# Patient Record
Sex: MALE | Race: BLACK OR AFRICAN AMERICAN
[De-identification: names, ages, dates, MRNs, and addresses within clinical notes are randomized per-mention and may not be internally consistent; named-entity substitution may affect disease eponyms.]

---

## 2017-06-10 ENCOUNTER — HOSPITAL ENCOUNTER (EMERGENCY)
Dept: HOSPITAL 72 - EMR | Age: 67
LOS: 1 days | Discharge: HOME | End: 2017-06-11
Payer: MEDICARE

## 2017-06-10 VITALS — BODY MASS INDEX: 28.6 KG/M2 | WEIGHT: 230 LBS | HEIGHT: 75 IN

## 2017-06-10 VITALS — DIASTOLIC BLOOD PRESSURE: 77 MMHG | SYSTOLIC BLOOD PRESSURE: 128 MMHG

## 2017-06-10 DIAGNOSIS — N30.00: ICD-10-CM

## 2017-06-10 DIAGNOSIS — I10: ICD-10-CM

## 2017-06-10 DIAGNOSIS — R53.1: Primary | ICD-10-CM

## 2017-06-10 DIAGNOSIS — E11.9: ICD-10-CM

## 2017-06-10 DIAGNOSIS — R60.9: ICD-10-CM

## 2017-06-10 LAB
APPEARANCE UR: CLEAR
BASOPHILS NFR BLD AUTO: 1.5 % (ref 0–2)
EOSINOPHIL NFR BLD AUTO: 1.3 % (ref 0–3)
ERYTHROCYTE [DISTWIDTH] IN BLOOD BY AUTOMATED COUNT: 12 % (ref 11.6–14.8)
KETONES UR QL STRIP: NEGATIVE
LEUKOCYTE ESTERASE UR QL STRIP: (no result)
LYMPHOCYTES NFR BLD AUTO: 38 % (ref 20–45)
MCH RBC QN AUTO: 32.5 PG (ref 27–31)
MCHC RBC AUTO-ENTMCNC: 36 G/DL (ref 32–36)
MCV RBC AUTO: 90 FL (ref 80–99)
MONOCYTES NFR BLD AUTO: 10.6 % (ref 1–10)
NEUTROPHILS NFR BLD AUTO: 48.7 % (ref 45–75)
NITRITE UR QL STRIP: NEGATIVE
PH UR STRIP: 6.5 [PH] (ref 4.5–8)
PLATELET # BLD: 196 K/UL (ref 150–450)
PMV BLD AUTO: 8.7 FL (ref 6.5–10.1)
PROT UR QL STRIP: NEGATIVE
RBC # BLD AUTO: 4.71 M/UL (ref 4.7–6.1)
SP GR UR STRIP: 1.01 (ref 1–1.03)
UROBILINOGEN UR-MCNC: 1 MG/DL (ref 0–1)
WBC # BLD AUTO: 6 K/UL (ref 4.8–10.8)

## 2017-06-10 PROCEDURE — 84484 ASSAY OF TROPONIN QUANT: CPT

## 2017-06-10 PROCEDURE — 85025 COMPLETE CBC W/AUTO DIFF WBC: CPT

## 2017-06-10 PROCEDURE — 82248 BILIRUBIN DIRECT: CPT

## 2017-06-10 PROCEDURE — 83880 ASSAY OF NATRIURETIC PEPTIDE: CPT

## 2017-06-10 PROCEDURE — 36415 COLL VENOUS BLD VENIPUNCTURE: CPT

## 2017-06-10 PROCEDURE — 87086 URINE CULTURE/COLONY COUNT: CPT

## 2017-06-10 PROCEDURE — 80053 COMPREHEN METABOLIC PANEL: CPT

## 2017-06-10 PROCEDURE — 81003 URINALYSIS AUTO W/O SCOPE: CPT

## 2017-06-10 PROCEDURE — 96374 THER/PROPH/DIAG INJ IV PUSH: CPT

## 2017-06-10 PROCEDURE — 71010: CPT

## 2017-06-10 PROCEDURE — 82550 ASSAY OF CK (CPK): CPT

## 2017-06-10 PROCEDURE — 70450 CT HEAD/BRAIN W/O DYE: CPT

## 2017-06-10 PROCEDURE — 99284 EMERGENCY DEPT VISIT MOD MDM: CPT

## 2017-06-10 PROCEDURE — 93005 ELECTROCARDIOGRAM TRACING: CPT

## 2017-06-10 PROCEDURE — 82553 CREATINE MB FRACTION: CPT

## 2017-06-10 PROCEDURE — 80300: CPT

## 2017-06-10 NOTE — EMERGENCY ROOM REPORT
History of Present Illness


General


Chief Complaint:  Generalized Weakness


Source:  Patient, EMS





Present Illness


HPI


Is a 67-year-old male with history hypertension.  He also has history of 

alcohol abuse.  He presents with chief complaint of generalized weakness and 

also pedal edema.  His been about a month now.  But worse tonight.  Complaining 

of penis in his fingers and feet.  Think that he may have stroke.  No nausea no 

vomiting.  No diaphoresis.  No chest pain.


Allergies:  


Coded Allergies:  


     No Known Allergies (Unverified , 12/16/13)





Patient History


Past Medical History:  see triage record, old chart reviewed, HTN


Past Surgical History:  other


Pertinent Family History:  none


Social History:  Reports: alcohol use


Immunizations:  other


Reviewed Nursing Documentation:  PMH: Agreed, PSxH: Agreed





Nursing Documentation-PMH


Past Medical History:  No History, Except For


Hx Hypertension:  Yes


Hx Diabetes:  Yes -  PTA


Hx Cancer:  No


Hx Gastrointestinal Problems:  No


Hx Neurological Problems:  No





Review of Systems


Constitutional:  Reports: weakness


Eye:  Denies: blurred vision, eye pain


ENT:  Denies: ear pain, nose congestion, throat swelling


Respiratory:  Denies: cough, shortness of breath


Cardiovascular:  Denies: chest pain, palpitations


Gastrointestinal:  Denies: abdominal pain, diarrhea, nausea, vomiting


Musculoskeletal:  Denies: back pain, joint pain


Skin:  Denies: rash


Neurological:  Denies: headache, numbness


Endocrine:  Denies: increased thirst, increased urine


Hematologic/Lymphatic:  Denies: easy bruising


All Other Systems:  negative except mentioned in HPI





Physical Exam





Vital Signs








  Date Time  Temp Pulse Resp B/P Pulse Ox O2 Delivery O2 Flow Rate FiO2


 


6/10/17 23:09  94 16 128/77 96 Room Air  


 


6/10/17 23:14 98.7       





vitals normal


Sp02 EP Interpretation:  reviewed, normal


General Appearance:  well appearing, no apparent distress, alert


Head:  normocephalic, atraumatic


Eyes:  bilateral eye EOMI, bilateral eye PERRL


ENT:  hearing grossly normal, normal pharynx


Neck:  full range of motion, supple, no meningismus


Respiratory:  chest non-tender, lungs clear, normal breath sounds


Cardiovascular #1:  regular rate, rhythm, no murmur


Gastrointestinal:  normal bowel sounds, non tender, no mass, no organomegaly, 

no bruit, non-distended


Musculoskeletal:  back normal, normal range of motion, swelling - 2+ pitting 

edema


Neurologic:  alert, oriented x3


Psychiatric:  mood/affect normal


Skin:  warm/dry





Medical Decision Making


Diagnostic Impression:  


 Primary Impression:  


 Episode of generalized weakness


 Additional Impressions:  


 UTI (urinary tract infection)


 Qualified Codes:  N30.00 - Acute cystitis without hematuria


 Pedal edema


ER Course


Patient presents with joint weakness.  No evidence of ACS, PE, CVA to name a 

few.  May have a mild urinary tract infection.  He diuresed well with IV Lasix.

  No evidence of CHF.  We'll discharge home to


Lab Results Impression


labs unremarkable


EKG Diagnostic Results


EKG Time:  23:52


EP Interpretation:  no


Rate:  normal


Rhythm:  NSR


ST Segments:  no acute changes





Rhythm Strip Diag. Results


Rhythm Strip Time:  23:53


EP Interpretation:  yes


Rate:  68


Rhythm:  NSR





Chest X-Ray Diagnostic Results


Chest X-Ray Ordered:  Yes


# of Views/Limited/Complete:  1 View


Interpretation:  no consolidation, no effusion, no pneumothorax, no acute 

cardiopulmonary disease


Indication:  Shortness of Breath


Impression:  No acute disease


Date Electronically Signed:  Fito 10, 2017


Time Electronically Signed:  23:53





CT/MRI/US Diagnostic Results


CT/MRI/US Diagnostic Results :  


   Imaging Test Ordered:  CT head


   Impression


negative per radiologist





Last Vital Signs








  Date Time  Temp Pulse Resp B/P Pulse Ox O2 Delivery O2 Flow Rate FiO2


 


6/10/17 23:14 98.7 92 16 128/77 96 Room Air  








Status:  improved


Disposition:  HOME, SELF-CARE


Condition:  Stable


Scripts


Cephalexin* (KEFLEX*) 500 Mg Capsule


500 MG ORAL TID, #21 CAP 0 Refills


   Prov: TERE HOFFMAN M.D.         6/11/17


Patient Instructions:  Weakness





Additional Instructions:  


Followup your DrTony in 7 days.  Increase your Lasix to 40 mg a day for a week.  

Return if worse.











TERE HOFFMAN M.D. Fito 10, 2017 23:53

## 2017-06-11 VITALS — SYSTOLIC BLOOD PRESSURE: 119 MMHG | DIASTOLIC BLOOD PRESSURE: 76 MMHG

## 2017-06-11 VITALS — DIASTOLIC BLOOD PRESSURE: 76 MMHG | SYSTOLIC BLOOD PRESSURE: 119 MMHG

## 2017-06-11 VITALS — DIASTOLIC BLOOD PRESSURE: 67 MMHG | SYSTOLIC BLOOD PRESSURE: 122 MMHG

## 2017-06-11 LAB
ALBUMIN/GLOB SERPL: 1.6 {RATIO} (ref 1–2.7)
ALT SERPL-CCNC: 14 U/L (ref 3–41)
ANION GAP SERPL CALC-SCNC: 14 MMOL/L (ref 5–15)
AST SERPL-CCNC: 25 U/L (ref 5–40)
BACTERIA #/AREA URNS HPF: (no result) /HPF
BILIRUB DIRECT SERPL-MCNC: 0.2 MG/DL (ref 0.1–0.3)
CALCIUM SERPL-MCNC: 9 MG/DL (ref 8.6–10.2)
CHLORIDE SERPL-SCNC: 101 MEQ/L (ref 98–107)
CK MB SERPL-MCNC: 6.9 NG/ML (ref ?–6.7)
CO2 SERPL-SCNC: 26 MEQ/L (ref 20–30)
CREAT SERPL-MCNC: 1.3 MG/DL (ref 0.7–1.2)
GFR SERPLBLD BASED ON 1.73 SQ M-ARVRAT: > 60 ML/MIN (ref 60–?)
GLOBULIN SER-MCNC: 2.5 G/DL
HEMOLYSIS: 4
POTASSIUM SERPL-SCNC: 3.9 MEQ/L (ref 3.4–4.9)
PROT SERPL-MCNC: 6.7 G/DL (ref 6.6–8.7)
RBC #/AREA URNS HPF: (no result) /HPF (ref 0–0)
SODIUM SERPL-SCNC: 141 MEQ/L (ref 135–145)
SQUAMOUS #/AREA URNS LPF: (no result) /LPF
TROPONIN I SERPL-MCNC: < 0.3 NG/ML (ref ?–0.3)
WBC #/AREA URNS HPF: (no result) /HPF (ref 0–0)
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
## 2017-06-13 NOTE — CARDIOLOGY REPORT
--------------- APPROVED REPORT --------------





EKG Measurement

Heart [id number]

ME [id number]

[id number]

[id number]

[id number]





Normal sinus rhythm

Normal ECG

## 2017-08-29 ENCOUNTER — HOSPITAL ENCOUNTER (EMERGENCY)
Dept: HOSPITAL 72 - EMR | Age: 67
LOS: 1 days | Discharge: HOME | End: 2017-08-30
Payer: MEDICARE

## 2017-08-29 VITALS — BODY MASS INDEX: 28.62 KG/M2 | HEIGHT: 76 IN | WEIGHT: 235 LBS

## 2017-08-29 VITALS — SYSTOLIC BLOOD PRESSURE: 147 MMHG | DIASTOLIC BLOOD PRESSURE: 80 MMHG

## 2017-08-29 DIAGNOSIS — I10: ICD-10-CM

## 2017-08-29 DIAGNOSIS — L02.31: Primary | ICD-10-CM

## 2017-08-29 DIAGNOSIS — L03.317: ICD-10-CM

## 2017-08-29 DIAGNOSIS — E11.9: ICD-10-CM

## 2017-08-29 PROCEDURE — 99283 EMERGENCY DEPT VISIT LOW MDM: CPT

## 2017-08-29 PROCEDURE — 64450 NJX AA&/STRD OTHER PN/BRANCH: CPT

## 2017-08-29 PROCEDURE — 10060 I&D ABSCESS SIMPLE/SINGLE: CPT

## 2017-08-30 ENCOUNTER — HOSPITAL ENCOUNTER (EMERGENCY)
Dept: HOSPITAL 72 - EMR | Age: 67
Discharge: HOME | End: 2017-08-30
Payer: MEDICARE

## 2017-08-30 VITALS — SYSTOLIC BLOOD PRESSURE: 138 MMHG | DIASTOLIC BLOOD PRESSURE: 78 MMHG

## 2017-08-30 VITALS — SYSTOLIC BLOOD PRESSURE: 134 MMHG | DIASTOLIC BLOOD PRESSURE: 77 MMHG

## 2017-08-30 VITALS — HEIGHT: 76 IN | WEIGHT: 235 LBS | BODY MASS INDEX: 28.62 KG/M2

## 2017-08-30 VITALS — DIASTOLIC BLOOD PRESSURE: 72 MMHG | SYSTOLIC BLOOD PRESSURE: 126 MMHG

## 2017-08-30 VITALS — SYSTOLIC BLOOD PRESSURE: 126 MMHG | DIASTOLIC BLOOD PRESSURE: 72 MMHG

## 2017-08-30 DIAGNOSIS — Z48.01: Primary | ICD-10-CM

## 2017-08-30 DIAGNOSIS — L02.31: ICD-10-CM

## 2017-08-30 DIAGNOSIS — E11.9: ICD-10-CM

## 2017-08-30 DIAGNOSIS — I10: ICD-10-CM

## 2017-08-30 DIAGNOSIS — F11.20: ICD-10-CM

## 2017-08-30 PROCEDURE — 99283 EMERGENCY DEPT VISIT LOW MDM: CPT

## 2017-08-30 NOTE — EMERGENCY ROOM REPORT
History of Present Illness


General


Chief Complaint:  Skin Rash/Abscess


Source:  Patient





Present Illness


HPI


Is a 67-year-old male with a history of diabetes and hypertension.  He uses a 

walker.  He was seen here last night for an abscess to his buttock.  He was I&

D.  He came in for wound check.  He also complained that the dressing  was 

across his anus and because of his disability he cannot get to it.  He had a 

bowel movement and the stool is stuck to the dressing..  Denies any other 

complaint.


Allergies:  


Coded Allergies:  


     No Known Allergies (Unverified , 12/16/13)





Patient History


Past Medical History:  see triage record, old chart reviewed


Past Surgical History:  other


Pertinent Family History:  none


Social History:  Denies: smoking


Immunizations:  other


Reviewed Nursing Documentation:  PMH: Agreed, PSxH: Agreed





Nursing Documentation-PMH


Hx Cardiac Problems:  Yes - cervical spine surgery


Hx Hypertension:  Yes


Hx Diabetes:  Yes -  PTA


Hx Cancer:  No


Hx Gastrointestinal Problems:  No





Review of Systems


Eye:  Denies: eye pain, blurred vision


ENT:  Denies: ear pain, nose congestion, throat swelling


Respiratory:  Denies: cough, shortness of breath


Cardiovascular:  Denies: chest pain, palpitations


Gastrointestinal:  Denies: abdominal pain, diarrhea, nausea, vomiting


Musculoskeletal:  Denies: back pain, joint pain


Skin:  Denies: rash


Neurological:  Denies: headache, numbness


Endocrine:  Denies: increased thirst, increased urine


Hematologic/Lymphatic:  Denies: easy bruising


All Other Systems:  negative except mentioned in HPI





Physical Exam





Vital Signs








  Date Time  Temp Pulse Resp B/P (MAP) Pulse Ox O2 Delivery O2 Flow Rate FiO2


 


8/30/17 22:16 97.3 94 18 134/77 98 Room Air  





vitals normal


Sp02 EP Interpretation:  reviewed, normal


General Appearance:  well appearing, no apparent distress, alert


Head:  normocephalic, atraumatic


Eyes:  bilateral eye PERRL, bilateral eye EOMI


ENT:  hearing grossly normal, normal pharynx


Neck:  full range of motion, supple, no meningismus


Respiratory:  chest non-tender, lungs clear, normal breath sounds


Cardiovascular #1:  regular rate, rhythm, no murmur


Gastrointestinal:  normal bowel sounds, non tender, no mass, no organomegaly, 

no bruit, non-distended


Genitourinary:  other - Wound to the right buttock show good granulation 

tissue.  There is to an indurated area of about 3-4 cm.  No drainage however.  

The dressing and the area around it is full of stool.  After cleaning him up I 

examine and it looks well.


Musculoskeletal:  back normal, normal range of motion, other - Patient use a 

walker


Neurologic:  alert


Psychiatric:  mood/affect normal


Skin:  warm/dry





Medical Decision Making


Diagnostic Impression:  


 Primary Impression:  


 Wound check, abscess


 Additional Impression:  


 Opioid dependence


 Qualified Codes:  F11.20 - Opioid dependence, uncomplicated


ER Course


Patient presents with a wound check.  The wound looks clean.  We'll put a small 

a dressing so doesn't appear with his bowel movement.  Area cleaned up.  

Patient received large amount of oxycodone every month.  I will put him on 

Motrin.  Initially he did not volunteer this.  I saw this on the [employer] system.





Last Vital Signs








  Date Time  Temp Pulse Resp B/P (MAP) Pulse Ox O2 Delivery O2 Flow Rate FiO2


 


8/30/17 22:16 97.3 94 18 134/77 98 Room Air  








Status:  improved


Disposition:  HOME, SELF-CARE


Condition:  Stable


Scripts


Ibuprofen* (MOTRIN*) 600 Mg Tablet


600 MG ORAL THREE TIMES A DAY, #30 TAB 0 Refills


   Prov: TERE HOFFMAN M.D.         8/30/17


Referrals:  


JIE BLUNT (PCP)


Patient Instructions:  Abscess





Additional Instructions:  


Keep wound clean.  Followup with your DrTony in 7 days.  Return if symptom worsen.











TERE HOFFMAN M.D. Aug 30, 2017 22:52

## 2017-08-30 NOTE — EMERGENCY ROOM REPORT
History of Present Illness


General


Chief Complaint:  Skin Rash/Abscess


Source:  Patient





Present Illness


HPI


67-year-old male no significant past medical history presenting with right-

sided buttock pain and swelling for 3 days.  Patient states that he noticed 

lump that was getting more swollen.  Denies any purulent drainage.  Patient 

denies any history of diabetes or any history of any abscesses on bodies.  

Patient denies any fever or chills.  Denies any pain with defecation


Allergies:  


Coded Allergies:  


     No Known Allergies (Unverified , 12/16/13)





Patient History


Past Medical History:  see triage record


Past Surgical History:  none


Pertinent Family History:  none


Reviewed Nursing Documentation:  PMH: Agreed, PSxH: Agreed





Nursing Documentation-PMH


Past Medical History:  No History, Except For


Hx Hypertension:  Yes


Hx Diabetes:  Yes -  PTA


Hx Cancer:  No


Hx Gastrointestinal Problems:  No


Hx Neurological Problems:  No





Review of Systems


All Other Systems:  negative except mentioned in HPI





Physical Exam





Vital Signs








  Date Time  Temp Pulse Resp B/P (MAP) Pulse Ox O2 Delivery O2 Flow Rate FiO2


 


8/29/17 23:25 98.4 90 15 147/80 99 Room Air  








Sp02 EP Interpretation:  reviewed, normal


General Appearance:  normal inspection, well appearing, no apparent distress, 

alert, GCS 15, non-toxic


Head:  normocephalic, atraumatic


Eyes:  bilateral eye normal inspection, bilateral eye PERRL, bilateral eye EOMI


ENT:  normal ENT inspection, normal pharynx, normal voice, moist mucus membranes


Neck:  normal inspection, full range of motion, supple


Respiratory:  normal inspection, lungs clear, normal breath sounds, no 

respiratory distress, no retraction, no wheezing, speaking full sentences, 

chest symmetrical


Cardiovascular #1:  normal inspection, regular rate, rhythm, no edema, normal 

capillary refill


Cardiovascular #2:  2+ radial (R), 2+ radial (L)


Gastrointestinal:  normal inspection, non tender, soft, non-distended, no 

guarding


Rectal:  other - 2 x 2 centimeter area of induration and erythema on right but 

talks, not adjacent to perirectal area


Genitourinary:  no CVA tenderness


Musculoskeletal:  normal inspection, back normal, normal range of motion, non-

tender


Neurologic:  normal inspection, alert, oriented x3, responsive, motor strength/

tone normal, sensory intact, normal gait, speech normal


Psychiatric:  normal inspection, judgement/insight normal, memory normal


Skin:  warm/dry, well hydrated, normal turgor, other - Right buttocks with 2 x 

2 centimeter area of erythema and induration.  Tender to palpation.  No 

purulent drainage..





Procedures


Incision and Drainage


Incision and Drainage :  


   Consent:  Verbal


   Site:  R buttocks


   Blade Size:  11


   I & D Procedure:  betadine prep, sterile drapes applied, sterile dressing 

applied, gauze wick placed


   Wound Location:  other - R buttocks


   Wound's Depth, Shape:  superficial, linear


   Wound Length (cm):  3


   Wound Explored:  clean


   Anesthesia:  Lidocaine w/ Epi


   Volume Anesthetic (ccs):  5


   Patient Tolerated:  Well


   Complications:  None





Medical Decision Making


Diagnostic Impression:  


 Primary Impression:  


 Abscess


 Additional Impression:  


 Cellulitis of buttock, right


ER Course


67-year-old male with right buttocks pain and swelling for 3 days





Differential diagnosis


Abscess with cellulitis





Plan


Incision and drainage


Antibiotics





ER course


Incision and drainage performed minimal purulent drainage





Disposition


Patient is to be discharged to home.  Patient was instructed to take 

antibiotics.  Patient is instructed to see his primary care DrTony within 2 days 

for wound recheck.  Patient is instructed to return to the emergency room for 

worsening rash, fever, chills,.





Last Vital Signs








  Date Time  Temp Pulse Resp B/P (MAP) Pulse Ox O2 Delivery O2 Flow Rate FiO2


 


8/29/17 23:25 98.4 83 16 147/80 100 Room Air  








Disposition:  HOME, SELF-CARE


Condition:  Improved


Scripts


Doxycycline Monohydrate* (DOXYCYCLINE MONOHYDRATE*) 100 Mg Capsule


100 MG ORAL Q12H for 7 Days, #14 CAP 0 Refills


   Prov: Edel Lieberman M.D.         8/30/17 


Cephalexin* (KEFLEX*) 500 Mg Capsule


500 MG ORAL Q6H for 10 Days, #28 CAP 0 Refills


   Prov: Edel Lieberman M.D.         8/30/17


Referrals:  


JIE BLUNT (PCP)











Edel Lieberman M.D. Aug 30, 2017 00:04

## 2018-03-07 ENCOUNTER — HOSPITAL ENCOUNTER (EMERGENCY)
Dept: HOSPITAL 72 - EMR | Age: 68
LOS: 1 days | Discharge: HOME | End: 2018-03-08
Payer: MEDICARE

## 2018-03-07 VITALS — SYSTOLIC BLOOD PRESSURE: 115 MMHG | DIASTOLIC BLOOD PRESSURE: 78 MMHG

## 2018-03-07 VITALS — BODY MASS INDEX: 28.62 KG/M2 | HEIGHT: 76 IN | WEIGHT: 235 LBS

## 2018-03-07 DIAGNOSIS — R60.0: ICD-10-CM

## 2018-03-07 DIAGNOSIS — R10.9: Primary | ICD-10-CM

## 2018-03-07 DIAGNOSIS — I10: ICD-10-CM

## 2018-03-07 DIAGNOSIS — E11.9: ICD-10-CM

## 2018-03-07 LAB
ADD MANUAL DIFF: NO
ALBUMIN SERPL-MCNC: 3.4 G/DL (ref 3.4–5)
ALBUMIN/GLOB SERPL: 1.1 {RATIO} (ref 1–2.7)
ALP SERPL-CCNC: 101 U/L (ref 46–116)
ALT SERPL-CCNC: 27 U/L (ref 12–78)
ANION GAP SERPL CALC-SCNC: 5 MMOL/L (ref 5–15)
APPEARANCE UR: CLEAR
APTT BLD: 27 SEC (ref 23–33)
APTT PPP: YELLOW S
AST SERPL-CCNC: 24 U/L (ref 15–37)
BASOPHILS NFR BLD AUTO: 2.5 % (ref 0–2)
BILIRUB DIRECT SERPL-MCNC: 0.3 MG/DL (ref 0–0.3)
BILIRUB SERPL-MCNC: 1.7 MG/DL (ref 0.2–1)
BUN SERPL-MCNC: 16 MG/DL (ref 7–18)
CALCIUM SERPL-MCNC: 8.7 MG/DL (ref 8.5–10.1)
CHLORIDE SERPL-SCNC: 106 MMOL/L (ref 98–107)
CO2 SERPL-SCNC: 27 MMOL/L (ref 21–32)
CREAT SERPL-MCNC: 1.5 MG/DL (ref 0.55–1.3)
EOSINOPHIL NFR BLD AUTO: 1.7 % (ref 0–3)
ERYTHROCYTE [DISTWIDTH] IN BLOOD BY AUTOMATED COUNT: 11.6 % (ref 11.6–14.8)
GLOBULIN SER-MCNC: 3.1 G/DL
GLUCOSE UR STRIP-MCNC: NEGATIVE MG/DL
HCT VFR BLD CALC: 41.8 % (ref 42–52)
HGB BLD-MCNC: 15 G/DL (ref 14.2–18)
INR PPP: 1 (ref 0.9–1.1)
KETONES UR QL STRIP: NEGATIVE
LEUKOCYTE ESTERASE UR QL STRIP: (no result)
LYMPHOCYTES NFR BLD AUTO: 30.7 % (ref 20–45)
MCV RBC AUTO: 90 FL (ref 80–99)
MONOCYTES NFR BLD AUTO: 14.6 % (ref 1–10)
NEUTROPHILS NFR BLD AUTO: 50.6 % (ref 45–75)
NITRITE UR QL STRIP: NEGATIVE
PH UR STRIP: 6.5 [PH] (ref 4.5–8)
PLATELET # BLD: 178 K/UL (ref 150–450)
POTASSIUM SERPL-SCNC: 3.9 MMOL/L (ref 3.5–5.1)
PROT UR QL STRIP: NEGATIVE
RBC # BLD AUTO: 4.66 M/UL (ref 4.7–6.1)
SODIUM SERPL-SCNC: 138 MMOL/L (ref 136–145)
SP GR UR STRIP: 1.01 (ref 1–1.03)
UROBILINOGEN UR-MCNC: 1 MG/DL (ref 0–1)
WBC # BLD AUTO: 4 K/UL (ref 4.8–10.8)

## 2018-03-07 PROCEDURE — 80053 COMPREHEN METABOLIC PANEL: CPT

## 2018-03-07 PROCEDURE — 82248 BILIRUBIN DIRECT: CPT

## 2018-03-07 PROCEDURE — 85610 PROTHROMBIN TIME: CPT

## 2018-03-07 PROCEDURE — 80307 DRUG TEST PRSMV CHEM ANLYZR: CPT

## 2018-03-07 PROCEDURE — 96374 THER/PROPH/DIAG INJ IV PUSH: CPT

## 2018-03-07 PROCEDURE — 85730 THROMBOPLASTIN TIME PARTIAL: CPT

## 2018-03-07 PROCEDURE — 99284 EMERGENCY DEPT VISIT MOD MDM: CPT

## 2018-03-07 PROCEDURE — 83690 ASSAY OF LIPASE: CPT

## 2018-03-07 PROCEDURE — 36415 COLL VENOUS BLD VENIPUNCTURE: CPT

## 2018-03-07 PROCEDURE — 85025 COMPLETE CBC W/AUTO DIFF WBC: CPT

## 2018-03-07 PROCEDURE — 81003 URINALYSIS AUTO W/O SCOPE: CPT

## 2018-03-07 PROCEDURE — 83880 ASSAY OF NATRIURETIC PEPTIDE: CPT

## 2018-03-07 NOTE — EMERGENCY ROOM REPORT
History of Present Illness


General


Chief Complaint:  Abdominal Pain


Source:  Patient





Present Illness


HPI


Mrs. 68-year-old male with a history of alcohol abuse.  He presents with chief 

complaint of "my liver is hurting."  Onset yesterday.  Achy cramping pain.  No 

nausea no vomiting.  No diarrhea.  Does have increasing edema to his lower 

extremity.  No shortness of breath.  No diaphoresis.  No chest pain.  Similar 

symptom in the past.  Pain is 7 out of 10.


Allergies:  


Coded Allergies:  


     No Known Allergies (Unverified , 12/16/13)





Patient History


Past Medical History:  see triage record, old chart reviewed


Past Surgical History:  unable to obtain


Pertinent Family History:  none


Social History:  Reports: alcohol use


Immunizations:  other


Reviewed Nursing Documentation:  PMH: Agreed, PSxH: Agreed





Nursing Documentation-PMH


Hx Cardiac Problems:  Yes - cervical spine surgery , cervical stenosos


Hx Hypertension:  Yes


Hx Diabetes:  Yes -  PTA


Hx Cancer:  No


Hx Gastrointestinal Problems:  No





Review of Systems


Eye:  Denies: eye pain, blurred vision


ENT:  Denies: ear pain, nose congestion, throat swelling


Respiratory:  Denies: cough, shortness of breath


Cardiovascular:  Denies: chest pain, palpitations


Gastrointestinal:  Reports: abdominal pain, Denies: diarrhea, nausea, vomiting


Musculoskeletal:  Denies: back pain, joint pain


Skin:  Denies: rash


Neurological:  Denies: headache, numbness


Endocrine:  Denies: increased thirst, increased urine


Hematologic/Lymphatic:  Denies: easy bruising


All Other Systems:  negative except mentioned in HPI





Physical Exam





Vital Signs








  Date Time  Temp Pulse Resp B/P (MAP) Pulse Ox O2 Delivery O2 Flow Rate FiO2


 


3/7/18 21:02 99.1 80 18 115/78 98 Room Air  





 99.1       





vitals normal


Sp02 EP Interpretation:  reviewed, normal


General Appearance:  well appearing, no apparent distress, alert


Head:  normocephalic, atraumatic


Eyes:  bilateral eye PERRL, bilateral eye EOMI


ENT:  hearing grossly normal, normal pharynx


Neck:  full range of motion, supple, no meningismus


Respiratory:  chest non-tender, lungs clear, normal breath sounds


Cardiovascular #1:  regular rate, rhythm, no murmur


Gastrointestinal:  normal bowel sounds, non tender, no mass, no organomegaly, 

no bruit, non-distended


Musculoskeletal:  back normal, gait/station normal, normal range of motion, 

swelling - 1+ pitting edema


Neurologic:  alert, oriented x3


Psychiatric:  mood/affect normal


Skin:  warm/dry





Medical Decision Making


Diagnostic Impression:  


 Primary Impression:  


 Abdominal pain of unknown etiology


 Additional Impression:  


 Pedal edema


ER Course


Patient with abdominal pain.  This may be secondary to alcoholic gastritis/

hepatitis/cirrhosis to name a few.  No evidence of an acute abdomen.  He felt 

better now.  We'll discharge home.


Lab Results Impression


labs unremarkable.





Last Vital Signs








  Date Time  Temp Pulse Resp B/P (MAP) Pulse Ox O2 Delivery O2 Flow Rate FiO2


 


3/7/18 21:30 99.1 80 18 115/78 98 Room Air  





 99.1       








Status:  improved


Disposition:  HOME, SELF-CARE


Condition:  Stable


Scripts


Ibuprofen* (MOTRIN*) 600 Mg Tablet


600 MG ORAL THREE TIMES A DAY, #30 TAB 0 Refills


   Prov: TERE HOFFMAN M.D.         3/7/18


Patient Instructions:  Abdominal Pain, Adult





Additional Instructions:  


Abstain from alcohol.  Follow-up with with doctor in 3-5 days.  Return if worse.











TERE HOFFMAN M.D. Mar 7, 2018 23:55

## 2018-03-08 VITALS — SYSTOLIC BLOOD PRESSURE: 169 MMHG | DIASTOLIC BLOOD PRESSURE: 92 MMHG

## 2019-01-04 ENCOUNTER — HOSPITAL ENCOUNTER (EMERGENCY)
Dept: HOSPITAL 87 - ER | Age: 69
Discharge: HOME | End: 2019-01-04
Payer: MEDICARE

## 2019-01-04 VITALS — WEIGHT: 308.65 LBS | BODY MASS INDEX: 41.8 KG/M2 | HEIGHT: 72 IN

## 2019-01-04 VITALS — SYSTOLIC BLOOD PRESSURE: 152 MMHG | DIASTOLIC BLOOD PRESSURE: 76 MMHG

## 2019-01-04 DIAGNOSIS — R53.1: Primary | ICD-10-CM

## 2019-01-04 DIAGNOSIS — H70.891: ICD-10-CM

## 2019-01-04 DIAGNOSIS — Z98.890: ICD-10-CM

## 2019-01-04 DIAGNOSIS — I10: ICD-10-CM

## 2019-01-04 DIAGNOSIS — E11.9: ICD-10-CM

## 2019-01-04 DIAGNOSIS — F14.10: ICD-10-CM

## 2019-01-04 LAB
AMPHETAMINES UR QL SCN: NEGATIVE
APTT PPP: 28.3 SEC (ref 23.4–31)
BARBITURATES UR QL SCN: NEGATIVE
BASOPHILS NFR BLD AUTO: 0.4 % (ref 0–2)
BENZODIAZ UR QL SCN: NEGATIVE
BZE UR QL SCN: (no result)
CANNABINOIDS UR QL SCN: NEGATIVE
CHLORIDE SERPL-SCNC: 105 MEQ/L (ref 98–107)
EOSINOPHIL NFR BLD AUTO: 1 % (ref 0–5)
ERYTHROCYTE [DISTWIDTH] IN BLOOD BY AUTOMATED COUNT: 13.5 % (ref 11.6–14.6)
ETHANOL SERPL-MCNC: < 10 MG/DL
HCT VFR BLD AUTO: 42.9 % (ref 42–52)
HGB BLD-MCNC: 15.2 G/DL (ref 14–18)
INR PPP: 1.1
LYMPHOCYTES NFR BLD AUTO: 17.9 % (ref 20–50)
MCH RBC QN AUTO: 33.6 PG (ref 28–32)
MCV RBC AUTO: 95.1 FL (ref 80–94)
METHADONE UR QL SCN: NEGATIVE
MONOCYTES NFR BLD AUTO: 12.5 % (ref 2–8)
NEUTROPHILS NFR BLD AUTO: 68.2 % (ref 40–76)
OPIATES UR QL SCN: NEGATIVE
PCP UR QL SCN: NEGATIVE
PLATELET # BLD AUTO: 212 X1000/UL (ref 130–400)
PMV BLD AUTO: 9.6 FL (ref 7.4–10.4)
PROTHROMBIN TIME: 10.7 SEC (ref 9.1–11.1)
RBC # BLD AUTO: 4.51 MILL/UL (ref 4.7–6.1)

## 2019-01-04 PROCEDURE — 83605 ASSAY OF LACTIC ACID: CPT

## 2019-01-04 PROCEDURE — 87086 URINE CULTURE/COLONY COUNT: CPT

## 2019-01-04 PROCEDURE — 85025 COMPLETE CBC W/AUTO DIFF WBC: CPT

## 2019-01-04 PROCEDURE — 36415 COLL VENOUS BLD VENIPUNCTURE: CPT

## 2019-01-04 PROCEDURE — 85610 PROTHROMBIN TIME: CPT

## 2019-01-04 PROCEDURE — 84484 ASSAY OF TROPONIN QUANT: CPT

## 2019-01-04 PROCEDURE — 72125 CT NECK SPINE W/O DYE: CPT

## 2019-01-04 PROCEDURE — 71045 X-RAY EXAM CHEST 1 VIEW: CPT

## 2019-01-04 PROCEDURE — 99284 EMERGENCY DEPT VISIT MOD MDM: CPT

## 2019-01-04 PROCEDURE — 70450 CT HEAD/BRAIN W/O DYE: CPT

## 2019-01-04 PROCEDURE — 80053 COMPREHEN METABOLIC PANEL: CPT

## 2019-01-04 PROCEDURE — 93005 ELECTROCARDIOGRAM TRACING: CPT

## 2019-01-04 PROCEDURE — 87040 BLOOD CULTURE FOR BACTERIA: CPT

## 2019-01-04 PROCEDURE — 80305 DRUG TEST PRSMV DIR OPT OBS: CPT

## 2019-01-04 PROCEDURE — 85730 THROMBOPLASTIN TIME PARTIAL: CPT

## 2019-01-04 PROCEDURE — 83880 ASSAY OF NATRIURETIC PEPTIDE: CPT

## 2019-05-27 ENCOUNTER — HOSPITAL ENCOUNTER (EMERGENCY)
Dept: HOSPITAL 87 - ER | Age: 69
Discharge: HOME | End: 2019-05-27
Payer: MEDICARE

## 2019-05-27 VITALS — SYSTOLIC BLOOD PRESSURE: 151 MMHG | DIASTOLIC BLOOD PRESSURE: 79 MMHG

## 2019-05-27 VITALS — HEIGHT: 76 IN | BODY MASS INDEX: 30.34 KG/M2 | WEIGHT: 249.12 LBS

## 2019-05-27 DIAGNOSIS — Z98.1: ICD-10-CM

## 2019-05-27 DIAGNOSIS — I10: ICD-10-CM

## 2019-05-27 DIAGNOSIS — H61.23: Primary | ICD-10-CM

## 2019-05-27 DIAGNOSIS — F99: ICD-10-CM

## 2019-05-27 DIAGNOSIS — F14.10: ICD-10-CM

## 2019-05-27 DIAGNOSIS — E11.9: ICD-10-CM

## 2019-05-27 PROCEDURE — 99283 EMERGENCY DEPT VISIT LOW MDM: CPT

## 2019-05-27 PROCEDURE — 69209 REMOVE IMPACTED EAR WAX UNI: CPT

## 2019-07-26 ENCOUNTER — HOSPITAL ENCOUNTER (INPATIENT)
Dept: HOSPITAL 87 - ER | Age: 69
LOS: 4 days | Discharge: LEFT BEFORE DISCHARGE | DRG: 291 | End: 2019-07-30
Attending: FAMILY MEDICINE | Admitting: FAMILY MEDICINE
Payer: MEDICARE

## 2019-07-26 VITALS — WEIGHT: 264 LBS | BODY MASS INDEX: 32.15 KG/M2 | HEIGHT: 76 IN

## 2019-07-26 DIAGNOSIS — I50.31: ICD-10-CM

## 2019-07-26 DIAGNOSIS — L03.115: ICD-10-CM

## 2019-07-26 DIAGNOSIS — M19.90: ICD-10-CM

## 2019-07-26 DIAGNOSIS — E66.9: ICD-10-CM

## 2019-07-26 DIAGNOSIS — E87.6: ICD-10-CM

## 2019-07-26 DIAGNOSIS — N18.2: ICD-10-CM

## 2019-07-26 DIAGNOSIS — I13.0: Primary | ICD-10-CM

## 2019-07-26 DIAGNOSIS — M48.00: ICD-10-CM

## 2019-07-26 DIAGNOSIS — L03.116: ICD-10-CM

## 2019-07-26 DIAGNOSIS — E87.1: ICD-10-CM

## 2019-07-26 DIAGNOSIS — Z79.899: ICD-10-CM

## 2019-07-26 PROCEDURE — 96366 THER/PROPH/DIAG IV INF ADDON: CPT

## 2019-07-26 PROCEDURE — 93306 TTE W/DOPPLER COMPLETE: CPT

## 2019-07-26 PROCEDURE — 84484 ASSAY OF TROPONIN QUANT: CPT

## 2019-07-26 PROCEDURE — 83880 ASSAY OF NATRIURETIC PEPTIDE: CPT

## 2019-07-26 PROCEDURE — 71045 X-RAY EXAM CHEST 1 VIEW: CPT

## 2019-07-26 PROCEDURE — 93970 EXTREMITY STUDY: CPT

## 2019-07-26 PROCEDURE — 96365 THER/PROPH/DIAG IV INF INIT: CPT

## 2019-07-26 PROCEDURE — 99291 CRITICAL CARE FIRST HOUR: CPT

## 2019-07-26 PROCEDURE — 80061 LIPID PANEL: CPT

## 2019-07-26 PROCEDURE — 36415 COLL VENOUS BLD VENIPUNCTURE: CPT

## 2019-07-26 PROCEDURE — 83605 ASSAY OF LACTIC ACID: CPT

## 2019-07-26 PROCEDURE — 93005 ELECTROCARDIOGRAM TRACING: CPT

## 2019-07-27 VITALS — DIASTOLIC BLOOD PRESSURE: 74 MMHG | SYSTOLIC BLOOD PRESSURE: 138 MMHG

## 2019-07-27 VITALS — DIASTOLIC BLOOD PRESSURE: 71 MMHG | SYSTOLIC BLOOD PRESSURE: 120 MMHG

## 2019-07-27 VITALS — SYSTOLIC BLOOD PRESSURE: 115 MMHG | DIASTOLIC BLOOD PRESSURE: 72 MMHG

## 2019-07-27 VITALS — DIASTOLIC BLOOD PRESSURE: 70 MMHG | SYSTOLIC BLOOD PRESSURE: 137 MMHG

## 2019-07-27 VITALS — SYSTOLIC BLOOD PRESSURE: 118 MMHG | DIASTOLIC BLOOD PRESSURE: 58 MMHG

## 2019-07-27 LAB
APTT PPP: 28 SEC (ref 23.4–31)
BASOPHILS NFR BLD MANUAL: 1 % (ref 0–2)
CHLORIDE SERPL-SCNC: 105 MEQ/L (ref 98–107)
CHLORIDE SERPL-SCNC: 110 MEQ/L (ref 98–107)
EOSINOPHIL NFR BLD MANUAL: 1 % (ref 0–5)
ERYTHROCYTE [DISTWIDTH] IN BLOOD BY AUTOMATED COUNT: 13.3 % (ref 11.6–14.6)
HCT VFR BLD AUTO: 39.8 % (ref 42–52)
HDLC SERPL-MCNC: 89 MG/DL (ref 40–59)
HGB BLD-MCNC: 13.9 G/DL (ref 14–18)
INR PPP: 1.1
LDLC SERPL DIRECT ASSAY-MCNC: 92 MG/DL (ref 5–100)
LYMPHOCYTES NFR BLD MANUAL: 36 % (ref 20–50)
MCH RBC QN AUTO: 32.2 PG (ref 28–32)
MCV RBC AUTO: 92.2 FL (ref 80–94)
MONOCYTES NFR BLD MANUAL: 14 % (ref 2–8)
NEUTS SEG NFR BLD MANUAL: 48 % (ref 45–75)
PLATELET # BLD AUTO: 176 X1000/UL (ref 130–400)
PLATELET # BLD EST: NORMAL 10*3/UL
PMV BLD AUTO: 9.5 FL (ref 7.4–10.4)
PROTHROMBIN TIME: 10.9 SEC (ref 9.6–11)
RBC # BLD AUTO: 4.32 MILL/UL (ref 4.7–6.1)

## 2019-07-27 RX ADMIN — ACETAMINOPHEN PRN MG: 325 TABLET, FILM COATED ORAL at 17:02

## 2019-07-27 RX ADMIN — CEFTRIAXONE SCH MLS/HR: 1 INJECTION, SOLUTION INTRAVENOUS at 20:56

## 2019-07-27 RX ADMIN — FUROSEMIDE SCH MG: 10 INJECTION, SOLUTION INTRAMUSCULAR; INTRAVENOUS at 09:17

## 2019-07-27 RX ADMIN — FUROSEMIDE SCH MG: 10 INJECTION, SOLUTION INTRAMUSCULAR; INTRAVENOUS at 17:01

## 2019-07-28 VITALS — SYSTOLIC BLOOD PRESSURE: 120 MMHG | DIASTOLIC BLOOD PRESSURE: 68 MMHG

## 2019-07-28 VITALS — DIASTOLIC BLOOD PRESSURE: 70 MMHG | SYSTOLIC BLOOD PRESSURE: 128 MMHG

## 2019-07-28 VITALS — SYSTOLIC BLOOD PRESSURE: 129 MMHG | DIASTOLIC BLOOD PRESSURE: 80 MMHG

## 2019-07-28 VITALS — SYSTOLIC BLOOD PRESSURE: 136 MMHG | DIASTOLIC BLOOD PRESSURE: 72 MMHG

## 2019-07-28 VITALS — DIASTOLIC BLOOD PRESSURE: 87 MMHG | SYSTOLIC BLOOD PRESSURE: 127 MMHG

## 2019-07-28 LAB
BASOPHILS NFR BLD AUTO: 0.5 % (ref 0–2)
EOSINOPHIL NFR BLD AUTO: 2.5 % (ref 0–5)
ERYTHROCYTE [DISTWIDTH] IN BLOOD BY AUTOMATED COUNT: 13.3 % (ref 11.6–14.6)
HCT VFR BLD AUTO: 42.3 % (ref 42–52)
HGB BLD-MCNC: 14.8 G/DL (ref 14–18)
LYMPHOCYTES NFR BLD AUTO: 32.1 % (ref 20–50)
MCH RBC QN AUTO: 32.2 PG (ref 28–32)
MCV RBC AUTO: 92.1 FL (ref 80–94)
MONOCYTES NFR BLD AUTO: 14.6 % (ref 2–8)
NEUTROPHILS NFR BLD AUTO: 50.3 % (ref 40–76)
PLATELET # BLD AUTO: 177 X1000/UL (ref 130–400)
PMV BLD AUTO: 9.9 FL (ref 7.4–10.4)
RBC # BLD AUTO: 4.59 MILL/UL (ref 4.7–6.1)

## 2019-07-28 RX ADMIN — ACETAMINOPHEN PRN MG: 325 TABLET, FILM COATED ORAL at 21:23

## 2019-07-28 RX ADMIN — FUROSEMIDE SCH MG: 10 INJECTION, SOLUTION INTRAMUSCULAR; INTRAVENOUS at 09:33

## 2019-07-28 RX ADMIN — DILTIAZEM HYDROCHLORIDE SCH MG: 180 CAPSULE, COATED, EXTENDED RELEASE ORAL at 15:50

## 2019-07-28 RX ADMIN — FUROSEMIDE SCH MG: 10 INJECTION, SOLUTION INTRAMUSCULAR; INTRAVENOUS at 17:50

## 2019-07-28 RX ADMIN — CEFTRIAXONE SCH MLS/HR: 1 INJECTION, SOLUTION INTRAVENOUS at 20:56

## 2019-07-29 VITALS — DIASTOLIC BLOOD PRESSURE: 62 MMHG | SYSTOLIC BLOOD PRESSURE: 119 MMHG

## 2019-07-29 VITALS — DIASTOLIC BLOOD PRESSURE: 76 MMHG | SYSTOLIC BLOOD PRESSURE: 120 MMHG

## 2019-07-29 VITALS — SYSTOLIC BLOOD PRESSURE: 117 MMHG | DIASTOLIC BLOOD PRESSURE: 75 MMHG

## 2019-07-29 VITALS — SYSTOLIC BLOOD PRESSURE: 131 MMHG | DIASTOLIC BLOOD PRESSURE: 76 MMHG

## 2019-07-29 VITALS — DIASTOLIC BLOOD PRESSURE: 78 MMHG | SYSTOLIC BLOOD PRESSURE: 121 MMHG

## 2019-07-29 VITALS — DIASTOLIC BLOOD PRESSURE: 72 MMHG | SYSTOLIC BLOOD PRESSURE: 110 MMHG

## 2019-07-29 VITALS — SYSTOLIC BLOOD PRESSURE: 122 MMHG | DIASTOLIC BLOOD PRESSURE: 75 MMHG

## 2019-07-29 LAB
BASOPHILS NFR BLD AUTO: 0.3 % (ref 0–2)
CHLORIDE SERPL-SCNC: 104 MEQ/L (ref 98–107)
EOSINOPHIL NFR BLD AUTO: 1.2 % (ref 0–5)
ERYTHROCYTE [DISTWIDTH] IN BLOOD BY AUTOMATED COUNT: 13.2 % (ref 11.6–14.6)
HCT VFR BLD AUTO: 45.5 % (ref 42–52)
HGB BLD-MCNC: 15.9 G/DL (ref 14–18)
LYMPHOCYTES NFR BLD AUTO: 25.8 % (ref 20–50)
MCH RBC QN AUTO: 31.9 PG (ref 28–32)
MCV RBC AUTO: 91.2 FL (ref 80–94)
MONOCYTES NFR BLD AUTO: 14.3 % (ref 2–8)
NEUTROPHILS NFR BLD AUTO: 58.4 % (ref 40–76)
PLATELET # BLD AUTO: 195 X1000/UL (ref 130–400)
PMV BLD AUTO: 10.1 FL (ref 7.4–10.4)
RBC # BLD AUTO: 4.99 MILL/UL (ref 4.7–6.1)

## 2019-07-29 RX ADMIN — DILTIAZEM HYDROCHLORIDE SCH MG: 180 CAPSULE, COATED, EXTENDED RELEASE ORAL at 09:00

## 2019-07-29 RX ADMIN — FUROSEMIDE SCH MG: 10 INJECTION, SOLUTION INTRAMUSCULAR; INTRAVENOUS at 10:36

## 2019-07-29 RX ADMIN — FUROSEMIDE SCH MG: 10 INJECTION, SOLUTION INTRAMUSCULAR; INTRAVENOUS at 09:00

## 2019-07-30 VITALS — DIASTOLIC BLOOD PRESSURE: 72 MMHG | SYSTOLIC BLOOD PRESSURE: 126 MMHG

## 2019-07-30 RX ADMIN — CEFTRIAXONE SCH MLS/HR: 1 INJECTION, SOLUTION INTRAVENOUS at 00:36

## 2019-07-30 RX ADMIN — CEFTRIAXONE SCH MLS/HR: 1 INJECTION, SOLUTION INTRAVENOUS at 00:33

## 2019-10-03 ENCOUNTER — HOSPITAL ENCOUNTER (EMERGENCY)
Dept: HOSPITAL 87 - ER | Age: 69
Discharge: HOME | End: 2019-10-03
Payer: MEDICARE

## 2019-10-03 VITALS — DIASTOLIC BLOOD PRESSURE: 87 MMHG | SYSTOLIC BLOOD PRESSURE: 130 MMHG

## 2019-10-03 VITALS — WEIGHT: 242.51 LBS | BODY MASS INDEX: 29.53 KG/M2 | HEIGHT: 76 IN

## 2019-10-03 DIAGNOSIS — R53.1: Primary | ICD-10-CM

## 2019-10-03 DIAGNOSIS — I10: ICD-10-CM

## 2019-10-03 DIAGNOSIS — E11.9: ICD-10-CM

## 2019-10-03 LAB
CHLORIDE SERPL-SCNC: 109 MEQ/L (ref 98–107)
ERYTHROCYTE [DISTWIDTH] IN BLOOD BY AUTOMATED COUNT: 13.4 % (ref 11.6–14.6)
HCT VFR BLD AUTO: 45.1 % (ref 42–52)
HGB BLD-MCNC: 15.7 G/DL (ref 14–18)
LYMPHOCYTES NFR BLD MANUAL: 30 % (ref 20–50)
MCH RBC QN AUTO: 31.6 PG (ref 28–32)
MCV RBC AUTO: 90.8 FL (ref 80–94)
MONOCYTES NFR BLD MANUAL: 12 % (ref 2–8)
NEUTS BAND NFR BLD MANUAL: 6 % (ref 1–6)
NEUTS SEG NFR BLD MANUAL: 52 % (ref 45–75)
PLATELET # BLD AUTO: 171 X1000/UL (ref 130–400)
PLATELET # BLD EST: NORMAL 10*3/UL
PMV BLD AUTO: 9.6 FL (ref 7.4–10.4)
RBC # BLD AUTO: 4.97 MILL/UL (ref 4.7–6.1)

## 2019-10-03 PROCEDURE — 99284 EMERGENCY DEPT VISIT MOD MDM: CPT

## 2019-10-03 PROCEDURE — 83880 ASSAY OF NATRIURETIC PEPTIDE: CPT

## 2019-10-03 PROCEDURE — 84484 ASSAY OF TROPONIN QUANT: CPT

## 2019-10-03 PROCEDURE — 93005 ELECTROCARDIOGRAM TRACING: CPT

## 2019-10-03 PROCEDURE — 71045 X-RAY EXAM CHEST 1 VIEW: CPT

## 2019-10-03 PROCEDURE — 36415 COLL VENOUS BLD VENIPUNCTURE: CPT

## 2019-10-29 ENCOUNTER — HOSPITAL ENCOUNTER (EMERGENCY)
Dept: HOSPITAL 87 - ER | Age: 69
Discharge: LEFT BEFORE DISCHARGE | End: 2019-10-29
Payer: MEDICARE

## 2019-10-29 VITALS — DIASTOLIC BLOOD PRESSURE: 63 MMHG | SYSTOLIC BLOOD PRESSURE: 108 MMHG

## 2019-10-29 VITALS — HEIGHT: 74 IN | WEIGHT: 253.53 LBS | BODY MASS INDEX: 32.54 KG/M2

## 2019-10-29 DIAGNOSIS — R07.9: Primary | ICD-10-CM

## 2019-10-29 DIAGNOSIS — Z79.899: ICD-10-CM

## 2019-10-29 DIAGNOSIS — R55: ICD-10-CM

## 2019-10-29 DIAGNOSIS — I10: ICD-10-CM

## 2019-10-29 DIAGNOSIS — Z98.890: ICD-10-CM

## 2019-10-29 PROCEDURE — 93005 ELECTROCARDIOGRAM TRACING: CPT

## 2019-10-29 PROCEDURE — 99283 EMERGENCY DEPT VISIT LOW MDM: CPT

## 2020-12-11 ENCOUNTER — HOSPITAL ENCOUNTER (EMERGENCY)
Dept: HOSPITAL 87 - ER | Age: 70
Discharge: HOME | End: 2020-12-11
Payer: MEDICARE

## 2020-12-11 VITALS — HEIGHT: 76 IN | WEIGHT: 240.3 LBS | BODY MASS INDEX: 29.26 KG/M2

## 2020-12-11 VITALS — SYSTOLIC BLOOD PRESSURE: 140 MMHG | DIASTOLIC BLOOD PRESSURE: 78 MMHG

## 2020-12-11 DIAGNOSIS — Z79.899: ICD-10-CM

## 2020-12-11 DIAGNOSIS — I10: ICD-10-CM

## 2020-12-11 DIAGNOSIS — L03.116: Primary | ICD-10-CM

## 2020-12-11 DIAGNOSIS — L03.115: ICD-10-CM

## 2020-12-11 LAB
CHLORIDE SERPL-SCNC: 105 MEQ/L (ref 98–107)
EOSINOPHIL NFR BLD MANUAL: 2 % (ref 0–5)
ERYTHROCYTE [DISTWIDTH] IN BLOOD BY AUTOMATED COUNT: 13.5 % (ref 11.6–14.6)
HCT VFR BLD AUTO: 42.3 % (ref 42–52)
HGB BLD-MCNC: 14.7 G/DL (ref 14–18)
LYMPHOCYTES NFR BLD MANUAL: 21 % (ref 20–50)
MCH RBC QN AUTO: 31.6 PG (ref 28–32)
MCV RBC AUTO: 91.1 FL (ref 80–94)
MONOCYTES NFR BLD MANUAL: 20 % (ref 2–8)
NEUTS SEG NFR BLD MANUAL: 57 % (ref 45–75)
PLATELET # BLD AUTO: 177 X1000/UL (ref 130–400)
PLATELET # BLD EST: NORMAL 10*3/UL
PMV BLD AUTO: 10 FL (ref 7.4–10.4)
RBC # BLD AUTO: 4.65 MILL/UL (ref 4.7–6.1)

## 2020-12-11 PROCEDURE — 85025 COMPLETE CBC W/AUTO DIFF WBC: CPT

## 2020-12-11 PROCEDURE — 99285 EMERGENCY DEPT VISIT HI MDM: CPT

## 2020-12-11 PROCEDURE — 73620 X-RAY EXAM OF FOOT: CPT

## 2020-12-11 PROCEDURE — 93970 EXTREMITY STUDY: CPT

## 2020-12-11 PROCEDURE — 80053 COMPREHEN METABOLIC PANEL: CPT

## 2020-12-11 PROCEDURE — 36415 COLL VENOUS BLD VENIPUNCTURE: CPT
